# Patient Record
Sex: MALE | Race: WHITE
[De-identification: names, ages, dates, MRNs, and addresses within clinical notes are randomized per-mention and may not be internally consistent; named-entity substitution may affect disease eponyms.]

---

## 2021-06-01 ENCOUNTER — HOSPITAL ENCOUNTER (OUTPATIENT)
Dept: HOSPITAL 47 - ORWHC2ENDO | Age: 51
Discharge: HOME | End: 2021-06-01
Attending: INTERNAL MEDICINE
Payer: COMMERCIAL

## 2021-06-01 VITALS — RESPIRATION RATE: 16 BRPM | TEMPERATURE: 98.1 F

## 2021-06-01 VITALS — HEART RATE: 57 BPM | DIASTOLIC BLOOD PRESSURE: 64 MMHG | SYSTOLIC BLOOD PRESSURE: 111 MMHG

## 2021-06-01 VITALS — BODY MASS INDEX: 36.8 KG/M2

## 2021-06-01 DIAGNOSIS — N40.0: ICD-10-CM

## 2021-06-01 DIAGNOSIS — Z79.899: ICD-10-CM

## 2021-06-01 DIAGNOSIS — D12.3: ICD-10-CM

## 2021-06-01 DIAGNOSIS — I10: ICD-10-CM

## 2021-06-01 DIAGNOSIS — Z12.11: Primary | ICD-10-CM

## 2021-06-01 DIAGNOSIS — E66.9: ICD-10-CM

## 2021-06-01 PROCEDURE — 45385 COLONOSCOPY W/LESION REMOVAL: CPT

## 2021-06-01 PROCEDURE — 45382 COLONOSCOPY W/CONTROL BLEED: CPT

## 2021-06-01 PROCEDURE — 45380 COLONOSCOPY AND BIOPSY: CPT

## 2021-06-01 PROCEDURE — 88305 TISSUE EXAM BY PATHOLOGIST: CPT

## 2021-06-01 NOTE — P.PCN
Date of Procedure: 06/01/21


Description of Procedure: 


BRIEF HISTORY: 


Patient is a 61-year-old male presenting for outpatient colonoscopy for 

screening for malignant neoplasm of the colon.  No prior colonoscopies reported.

 No change in bowel habits.  No abdominal pain.  No family history of colon 

cancer reported.





PROCEDURE PERFORMED: 


Colonoscopy with polypectomy and Endo Clip placement. 





PREOPERATIVE DIAGNOSIS: 


Screening for malignant neoplasm of the colon, no prior colonoscopy. 





ESTIMATED BLOOD LOSS: 


Minimal.





IV sedation per Anesthesia. 





PROCEDURE: 


After informed consent was obtained, the patient, was brought into the endoscopy

unit. IV sedation was administered by Anesthesia under continuous monitoring.  

Digital rectal examination was normal. Initially the Olympus CF-190 flexible 

video colonoscope was then inserted in the rectum, gradually advanced into the 

cecum without any difficulty. Careful examination was performed as the scope was

gradually being withdrawn. Ileocecal valve and the appendiceal orifice were 

visualized and appeared normal.  Prep was excellent. Mucosa of the cecum, 

ascending colon, transverse colon, descending colon, sigmoid colon, and rectum 

appeared normal 3 medium-sized polyps measuring 6-8 mm in size removed with cold

snare polypectomy from the hepatic flexure with Endo Clip placed for hemostasis 

at the site of one polypectomy.  Flat 4 mm transverse colon polyp removed with 

cold snare polypectomy.  Diminutive 1 mm transverse colon polyp removed with 

cold forcep polypectomy.  Flat 4 mm splenic flexure polyp removed with cold 

snare polypectomy.  Diminutive 2 mm descending colon polyp removed with cold 

forcep polypectomy.  3 polyps measuring 3-5 mm were removed from the sigmoid 

colon with cold snare polypectomy. Retroflexion was performed in the rectum and 

no lesions were seen. The patient tolerated the procedure well. 





IMPRESSION: 


8 polyps removed with cold snare polypectomy from the transverse colon, hepatic 

flexure 3, splenic flexure, and sigmoid colon 3.


2 diminutive polyps removed with cold forcep polypectomy from the transverse 

colon and descending colon.


Endo Clip placements for hemostasis at the site of polypectomy in the hepatic 

flexure.





RECOMMENDATIONS:  


Findings of this examination were discussed with the patient and his family.  

Okay to resume diet.  Okay to resume medications.  Await pathology from 

polypectomy.  Recommend repeat colonoscopy in 1 year for colon polyps any 

pathology from polypectomy

## 2023-07-07 ENCOUNTER — HOSPITAL ENCOUNTER (OUTPATIENT)
Dept: HOSPITAL 47 - OR | Age: 53
Discharge: HOME | End: 2023-07-07
Attending: SURGERY
Payer: COMMERCIAL

## 2023-07-07 VITALS — SYSTOLIC BLOOD PRESSURE: 112 MMHG | DIASTOLIC BLOOD PRESSURE: 66 MMHG | HEART RATE: 60 BPM

## 2023-07-07 VITALS — RESPIRATION RATE: 18 BRPM

## 2023-07-07 VITALS — BODY MASS INDEX: 39 KG/M2

## 2023-07-07 VITALS — TEMPERATURE: 96.8 F

## 2023-07-07 DIAGNOSIS — Z79.899: ICD-10-CM

## 2023-07-07 DIAGNOSIS — M19.90: ICD-10-CM

## 2023-07-07 DIAGNOSIS — K80.10: Primary | ICD-10-CM

## 2023-07-07 DIAGNOSIS — Z98.890: ICD-10-CM

## 2023-07-07 DIAGNOSIS — Z80.51: ICD-10-CM

## 2023-07-07 DIAGNOSIS — I10: ICD-10-CM

## 2023-07-07 PROCEDURE — 47562 LAPAROSCOPIC CHOLECYSTECTOMY: CPT

## 2023-07-07 PROCEDURE — 88304 TISSUE EXAM BY PATHOLOGIST: CPT

## 2023-07-07 NOTE — P.GSHP
History of Present Illness


H&P Date: 23


Chief Complaint: Chronic cholecystitis





53-year-old male known to our service.  Went to the ER recently complaining of 

right-sided abdominal pain.  Workup showed gallstones.  Liver enzymes normal.  

No change in the color of his skin urine or stool.  Symptoms aggravated by gre

asy foods.





Past Medical History


Past Medical History: Chest Pain / Angina, Hypertension, Osteoarthritis (OA), 

Prostate Disorder


Additional Past Medical History / Comment(s): BPH, HX CHESTPAIN-PT STATES STRESS

TEST AND HEART CATH NEGATIVE.


History of Any Multi-Drug Resistant Organisms: None Reported


Past Surgical History: Heart Catheterization, Orthopedic Surgery


Additional Past Surgical History / Comment(s): left hip surgery at 7 yrs old 

(pin inserted and removed)


Past Anesthesia/Blood Transfusion Reactions: No Reported Reaction


Additional Past Anesthesia/Blood Transfusion Reaction / Comment(s): no hx blood 

transfusion


Smoking Status: Never smoker





- Past Family History


  ** Father


Family Medical History: Cancer


Additional Family Medical History / Comment(s): Father  of kidney CA





  ** Mother


Family Medical History: Cancer


Additional Family Medical History / Comment(s): breast cancer





Medications and Allergies


                                Home Medications











 Medication  Instructions  Recorded  Confirmed  Type


 


Multivitamin [Men's Multi-Vitamin] 1 tab PO DAILY 16 History


 


Finasteride [Proscar] 5 mg PO QAM 21 History


 


Metoprolol Tartrate [Lopressor] 100 mg PO BID 21 History


 


lisinopriL [Prinivil] 10 mg PO HS 21 History








                                    Allergies











Allergy/AdvReac Type Severity Reaction Status Date / Time


 


No Known Allergies Allergy   Verified 23 16:38














Surgical - Exam








Physical exam:


General: Well-developed, well-nourished


HEENT: Normocephalic, sclerae nonicteric


Abdomen: Nontender, nondistended


Extremities: No edema


Neuro: Alert and oriented





Assessment and Plan


(1) Chronic cholecystitis


Narrative/Plan: 


53-year-old male with chronic cholecystitis.  We'll proceed with laparoscopic, 

possible open cholecystectomy. Risks of bleeding, infection, bile leak, bile 

duct injury, retained common bile duct stone, trocar injury, conversion to an 

open procedure, hernia, anesthesia related complications were reviewed.  The 

patient understands and wishes to proceed.


Status: Acute   Code(s): K81.1 - CHRONIC CHOLECYSTITIS   SNOMED Code(s): 

[id number]

## 2023-07-07 NOTE — P.OP
Date of Procedure: 07/07/23


Procedure(s) Performed: 


PREOPERATIVE DIAGNOSIS: Chronic cholecystitis


POSTOPERATIVE DIAGNOSIS: Same


PROCEDURE: Laparoscopic cholecystectomy


SURGEON: Cathleen


EBL: Minimal see anesthesia record


ANESTHESIA: Gen.


COMPLICATIONS: None


OPERATIVE PROCEDURE: The patient was brought and placed on the operating room 

table in the supine position.  The patient was placed under general anesthesia 

at that time.  The abdomen was prepped and draped in the usual sterile fashion. 

A small vertical infraumbilical incision was made.  The fascia was grasped with 

the Kocher forceps.  The fascia was retracted anteriorly.  The Veress needle was

advanced into the peritoneal cavity.  The saline drop test was normal.  

Insufflation took place up to 15 mmHg.  A 5 mm optical trocar was advanced and 

the peritoneal cavity.  2 additional 5 mm trochars were placed in the right 

upper quadrant under direct visualization.  A 12 mm trocar was advanced into the

epigastric incision site.  The gallbladder was retracted superiorly and 

laterally.  The peritoneum overlying the infundibulum was bluntly dissected.  

The patient's cystic duct was visualized.  The junction between the cystic duct 

common and hepatic duct was identified.  The critical view of safety was 

achieved after blunt dissection.  The cystic duct was then divided after 

placement of 3 12 mm clips on the patient's side and one on the specimen side.  

The cystic artery was identified and clipped as well.  A small vessel was seen 

along the gallbladder fossa and clipped as well.  The gallbladder was then 

removed from the liver bed using electrocautery.  The gallbladder was then 

removed from the epigastric trocar site with an Endo Catch bag.  The gallbladder

fossa was irrigated with saline.  There was no evidence of any bleeding or 

biliary drainage seen.  The fascia at the 12 millimeter site was closed using a 

Vick-Kary 0 Vicryl stitch.  The trochars were then removed.  The skin at 

all 4 sites was closed using a 4-0 Monocryl stitch.  Skin glue was utilized on 

the incision sites.  At the end of this procedure the sponge and needle counts 

were correct.


DISPOSITION: Stable to the recovery room